# Patient Record
Sex: MALE | Race: WHITE
[De-identification: names, ages, dates, MRNs, and addresses within clinical notes are randomized per-mention and may not be internally consistent; named-entity substitution may affect disease eponyms.]

---

## 2019-10-14 ENCOUNTER — HOSPITAL ENCOUNTER (EMERGENCY)
Dept: HOSPITAL 56 - MW.ED | Age: 34
Discharge: HOME | End: 2019-10-14
Payer: SELF-PAY

## 2019-10-14 DIAGNOSIS — Z88.5: ICD-10-CM

## 2019-10-14 DIAGNOSIS — Z98.890: ICD-10-CM

## 2019-10-14 DIAGNOSIS — N45.3: Primary | ICD-10-CM

## 2019-10-14 DIAGNOSIS — Z88.0: ICD-10-CM

## 2019-10-14 LAB
BUN SERPL-MCNC: 12 MG/DL (ref 7–18)
CHLORIDE SERPL-SCNC: 103 MMOL/L (ref 98–107)
CO2 SERPL-SCNC: 30.4 MMOL/L (ref 21–32)
GLUCOSE SERPL-MCNC: 106 MG/DL (ref 74–106)
POTASSIUM SERPL-SCNC: 4.4 MMOL/L (ref 3.5–5.1)
SODIUM SERPL-SCNC: 139 MMOL/L (ref 136–148)

## 2019-10-14 PROCEDURE — 87591 N.GONORRHOEAE DNA AMP PROB: CPT

## 2019-10-14 PROCEDURE — 80053 COMPREHEN METABOLIC PANEL: CPT

## 2019-10-14 PROCEDURE — 85025 COMPLETE CBC W/AUTO DIFF WBC: CPT

## 2019-10-14 PROCEDURE — 96375 TX/PRO/DX INJ NEW DRUG ADDON: CPT

## 2019-10-14 PROCEDURE — 76870 US EXAM SCROTUM: CPT

## 2019-10-14 PROCEDURE — 96374 THER/PROPH/DIAG INJ IV PUSH: CPT

## 2019-10-14 PROCEDURE — 93976 VASCULAR STUDY: CPT

## 2019-10-14 PROCEDURE — 74177 CT ABD & PELVIS W/CONTRAST: CPT

## 2019-10-14 PROCEDURE — 87491 CHLMYD TRACH DNA AMP PROBE: CPT

## 2019-10-14 PROCEDURE — 81003 URINALYSIS AUTO W/O SCOPE: CPT

## 2019-10-14 PROCEDURE — 99284 EMERGENCY DEPT VISIT MOD MDM: CPT

## 2019-10-14 PROCEDURE — 96361 HYDRATE IV INFUSION ADD-ON: CPT

## 2019-10-14 NOTE — US
INDICATION:



Right-sided pain. History of torsion. 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 

Prosthetic left testicle.



TECHNIQUE:



Ultrasound examination of the scrotum was performed.



COMPARISON:



None available. 



FINDINGS:



Right testicle is homogeneous in echotexture. No focal testicular lesion. 

There is normal appearing color Doppler flow in the right testicle. Right 

epididymis is within normal limits. No significant hydrocele. Prosthetic 

left testicle is demonstrated. Soft tissues elsewhere as imaged are 

unremarkable. 



IMPRESSION:



Normal-appearing right testicle and epididymis with normal appearing color 

Doppler flow.



Dictated by Haile Wellington MD @ 10/14/2019 8:45:25 PM



Dictated by: Haile Wellington MD @ 10/14/2019 20:45:33



(Electronically Signed)

## 2019-10-14 NOTE — EDM.PDOC
ED HPI GENERAL MEDICAL PROBLEM





- General


Chief Complaint: Genitourinary Problem


Stated Complaint: TESTICLE PAIN


Time Seen by Provider: 10/14/19 21:59


Source of Information: Reports: Patient





- History of Present Illness


INITIAL COMMENTS - FREE TEXT/NARRATIVE: 





HISTORY AND PHYSICAL:


History of present illness:


[Patient presents with right testicular pain he has history of torsion and 

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


Day he complains of 8 out of 10 right testicular pain similar to previous





He also has pain on exam of the epididymis limited exam due to pain





History of bilateral hernia repair and appendectomy per patient


]


Review of systems: 


As per history of present illness and below otherwise all systems reviewed and 

negative.


Past medical history: 


As per history of present illness and as reviewed below otherwise 

noncontributory.


Surgical history: 


As per history of present illness and as reviewed below otherwise 

noncontributory.


Social history: 


No reported history of drug or alcohol abuse.


Family history: 


As per history of present illness and as reviewed below otherwise 

noncontributory.





Physical exam:


HEENT: Atraumatic, normocephalic, pupils reactive, negative for conjunctival 

pallor or scleral icterus, mucous membranes moist, throat clear, neck supple, 

nontender, trachea midline.


Lungs: Clear to auscultation, breath sounds equal bilaterally, chest nontender.


Heart: S1S2, regular, negative for clicks, rubs, or JVD.


Abdomen: Soft, nondistended, nontender. Negative for masses or 

hepatosplenomegaly. Negative for costovertebral tenderness.


Pelvis: Stable nontender.


Genitourinary: Right testicular pain limited exam due to pain, unable to fully 

assess for hernia due to pain


Rectal: Deferred.


Extremities: Atraumatic, negative for cords or calf pain. Neurovascular 

unremarkable.


Neuro: Awake, alert, oriented. Cranial nerves II through XII unremarkable. 

Cerebellum unremarkable. Motor and sensory unremarkable throughout. Exam 

nonfocal.





Diagnostics:


[CBC CMP UA UC Chlamydia


Ultrasound scrotum and contents


CT abdomen pelvis with contrast


]


Therapeutics:


[ normal saline


Morphine


Azithromycin 2 g by mouth now





Cipro 500 by mouth twice a day #20 no refill


Norco


]


Impression:


orchitis on the right


defnitive disposition and diagnosis as appropriate pending reevaluation and 

review of above.


  ** Right Scrotum


Pain Score (Numeric/FACES): 7





- Related Data


 Allergies











Allergy/AdvReac Type Severity Reaction Status Date / Time


 


Penicillins Allergy  Rash Verified 10/14/19 19:59


 


tramadol Allergy  Seizure Verified 10/14/19 19:59











Home Meds: 


 Home Meds





. [No Known Home Meds]  10/14/19 [History]











Past Medical History


Respiratory History: Reports: Pneumonia, Recurrent


Other Respiratory History: Legionaires DX


Other Genitourinary History: testicular torsion


Psychiatric History: Reports: Anxiety, Depression, PTSD





- Infectious Disease History


Infectious Disease History: Reports: Chicken Pox





Social & Family History





- Family History


Family Medical History: Noncontributory





- Tobacco Use


Smoking Status *Q: Never Smoker





- Caffeine Use


Caffeine Use: Reports: None





- Recreational Drug Use


Recreational Drug Use: Yes


Recreational Drug Type: Reports: Marijuana/Hashish


Recreational Drug Use Frequency: Socially





ED ROS GENERAL





- Review of Systems


Review Of Systems: See Below





ED EXAM, GENERAL





- Physical Exam


Exam: See Below





Course





- Vital Signs


Last Recorded V/S: 


 Last Vital Signs











Temp  97.5 F   10/14/19 22:57


 


Pulse  72   10/14/19 22:57


 


Resp  18   10/14/19 22:57


 


BP  119/75   10/14/19 22:57


 


Pulse Ox  98   10/14/19 22:57














- Orders/Labs/Meds


Orders: 


 Active Orders 24 hr











 Category Date Time Status


 


 Scrotal Duplex Ltd [US] Routine Exams  10/14/19 20:00 Taken


 


 CHLAMYDIA AND GONORRHEA BY [facility]  10/14/19 20:28 Received











Labs: 


 Laboratory Tests











  10/14/19 10/14/19 10/14/19 Range/Units





  20:28 21:20 21:20 


 


WBC   6.82   (4.0-11.0)  K/uL


 


RBC   5.48   (4.50-5.90)  M/uL


 


Hgb   16.6   (13.0-17.0)  g/dL


 


Hct   48.4   (38.0-50.0)  %


 


MCV   88.3   (80.0-98.0)  fL


 


MCH   30.3   (27.0-32.0)  pg


 


MCHC   34.3   (31.0-37.0)  g/dL


 


RDW Std Deviation   43.8   (28.0-62.0)  fl


 


RDW Coeff of Lolly   13   (11.0-15.0)  %


 


Plt Count   288   (150-400)  K/uL


 


MPV   8.80   (7.40-12.00)  fL


 


Neut % (Auto)   45.8 L   (48.0-80.0)  %


 


Lymph % (Auto)   38.4   (16.0-40.0)  %


 


Mono % (Auto)   13.5   (0.0-15.0)  %


 


Eos % (Auto)   2.2   (0.0-7.0)  %


 


Baso % (Auto)   0.1   (0.0-1.5)  %


 


Neut # (Auto)   3.1   (1.4-5.7)  K/uL


 


Lymph # (Auto)   2.6 H   (0.6-2.4)  K/uL


 


Mono # (Auto)   0.9 H   (0.0-0.8)  K/uL


 


Eos # (Auto)   0.2   (0.0-0.7)  K/uL


 


Baso # (Auto)   0.0   (0.0-0.1)  K/uL


 


Nucleated RBC %   0.0   /100WBC


 


Nucleated RBCs #   0   K/uL


 


Sodium    139  (136-148)  mmol/L


 


Potassium    4.4  (3.5-5.1)  mmol/L


 


Chloride    103  ()  mmol/L


 


Carbon Dioxide    30.4  (21.0-32.0)  mmol/L


 


BUN    12  (7.0-18.0)  mg/dL


 


Creatinine    1.3  (0.8-1.3)  mg/dL


 


Est Cr Clr Drug Dosing    94.79  mL/min


 


Estimated GFR (MDRD)    > 60.0  ml/min


 


Glucose    106  ()  mg/dL


 


Calcium    9.3  (8.5-10.1)  mg/dL


 


Total Bilirubin    0.7  (0.2-1.0)  mg/dL


 


AST    29  (15-37)  IU/L


 


ALT    45  (14-63)  IU/L


 


Alkaline Phosphatase    53  ()  U/L


 


Total Protein    8.3 H  (6.4-8.2)  g/dL


 


Albumin    4.2  (3.4-5.0)  g/dL


 


Globulin    4.1 H  (2.6-4.0)  g/dL


 


Albumin/Globulin Ratio    1.0  (0.9-1.6)  


 


Urine Color  YELLOW    


 


Urine Appearance  CLEAR    


 


Urine pH  6.5    (5.0-8.0)  


 


Ur Specific Gravity  1.015    (1.001-1.035)  


 


Urine Protein  NEGATIVE    (NEGATIVE)  mg/dL


 


Urine Glucose (UA)  NEGATIVE    (NEGATIVE)  mg/dL


 


Urine Ketones  NEGATIVE    (NEGATIVE)  mg/dL


 


Urine Occult Blood  NEGATIVE    (NEGATIVE)  


 


Urine Nitrite  NEGATIVE    (NEGATIVE)  


 


Urine Bilirubin  NEGATIVE    (NEGATIVE)  


 


Urine Urobilinogen  0.2    (<2.0)  EU/dL


 


Ur Leukocyte Esterase  NEGATIVE    (NEGATIVE)  











Meds: 


Medications














Discontinued Medications














Generic Name Dose Route Start Last Admin





  Trade Name Freq  PRN Reason Stop Dose Admin


 


Azithromycin  2,000 mg  10/14/19 23:01  





  Zithromax  PO  10/14/19 23:02  





  NOW STA   





     





     





     





     


 


Sodium Chloride  1,000 mls @ 999 mls/hr  10/14/19 21:17  10/14/19 21:26





  Normal Saline  IV  10/14/19 22:17  999 mls/hr





  STAT ONE   Administration





     





     





     





     


 


Iopamidol  100 ml  10/14/19 22:33  10/14/19 22:34





  Isovue Multipack-370 (76%)  IVPUSH  10/14/19 22:34  100 ml





  ONETIME STA   Administration





     





     





     





     


 


Morphine Sulfate  4 mg  10/14/19 21:17  10/14/19 21:28





  Morphine  IVPUSH  10/14/19 21:18  4 mg





  ONETIME ONE   Administration





     





     





     





     


 


Ondansetron HCl  8 mg  10/14/19 21:17  10/14/19 21:27





  Zofran  IVPUSH  10/14/19 21:18  8 mg





  ONETIME ONE   Administration





     





     





     





     














Departure





- Departure


Time of Disposition: 23:07


Disposition: Home, Self-Care 01


Condition: Good


Clinical Impression: 


 Orchitis and epididymitis








- Discharge Information


Referrals: 


PCP,None [Primary Care Provider] - 


Forms:  ED Department Discharge


Additional Instructions: 


The following information is given to patients seen in the emergency department 

who are being discharged to home. This information is to outline your options 

for follow-up care. We provide all patients seen in our emergency department 

with a follow-up referral.





The need for follow-up, as well as the timing and circumstances, are variable 

depending upon the specifics of your emergency department visit.





If you don't have a primary care physician on staff, we will provide you with a 

referral. We always advise you to contact your personal physician following an 

emergency department visit to inform them of the circumstance of the visit and 

for follow-up with them and/or the need for any referrals to a consulting 

specialist.





The emergency department will also refer you to a specialist when appropriate. 

This referral assures that you have the opportunity for follow-up care with a 

specialist. All of these measure are taken in an effort to provide you with 

optimal care, which includes your follow-up.





Under all circumstances we always encourage you to contact your private 

physician who remains a resource for coordinating your care. When calling for 

follow-up care, please make the office aware that this follow-up is from your 

recent emergency room visit. If for any reason you are refused follow-up, 

please contact the [facility] emergency department at [phone] 

and asked to speak to the emergency department charge nurse.








- My Orders


Last 24 Hours: 


My Active Orders





10/14/19 20:00


Scrotal Duplex Ltd [US] Routine 





10/14/19 20:28


CHLAMYDIA AND GONORRHEA BY [facility] Stat 














- Assessment/Plan


Last 24 Hours: 


My Active Orders





10/14/19 20:00


Scrotal Duplex Ltd [US] Routine 





10/14/19 20:28


CHLAMYDIA AND GONORRHEA BY [facility] Stat

## 2019-10-15 NOTE — US
INDICATION: Right-sided pain. History of torsion. Left testicle removed due to 
trauma. Prosthetic left testicle.



TECHNIQUE: Ultrasound examination of the scrotum was performed.



COMPARISON: None available. 



FINDINGS: Right testicle is homogeneous in echotexture. No focal testicular 
lesion. There is normal appearing color Doppler flow in the right testicle. 
Right epididymis is within normal limits. No significant hydrocele. Prosthetic 
left testicle is demonstrated. Soft tissues elsewhere as imaged are 
unremarkable. 



IMPRESSION: Normal-appearing right testicle and epididymis with normal 
appearing color Doppler flow.
MTDD